# Patient Record
Sex: MALE | Race: WHITE
[De-identification: names, ages, dates, MRNs, and addresses within clinical notes are randomized per-mention and may not be internally consistent; named-entity substitution may affect disease eponyms.]

---

## 2017-12-25 ENCOUNTER — HOSPITAL ENCOUNTER (INPATIENT)
Dept: HOSPITAL 94 - ER | Age: 77
LOS: 6 days | Discharge: HOME | DRG: 543 | End: 2017-12-31
Attending: GENERAL PRACTICE | Admitting: FAMILY MEDICINE
Payer: MEDICARE

## 2017-12-25 VITALS — HEIGHT: 68 IN | WEIGHT: 130.07 LBS | BODY MASS INDEX: 19.71 KG/M2

## 2017-12-25 VITALS — DIASTOLIC BLOOD PRESSURE: 89 MMHG | SYSTOLIC BLOOD PRESSURE: 192 MMHG

## 2017-12-25 DIAGNOSIS — K86.2: ICD-10-CM
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
## 2017-12-25 DIAGNOSIS — F10.10: ICD-10-CM

## 2017-12-25 DIAGNOSIS — G89.29: ICD-10-CM

## 2017-12-25 DIAGNOSIS — M48.56XA: Primary | ICD-10-CM

## 2017-12-25 DIAGNOSIS — Z91.81: ICD-10-CM
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
## 2017-12-25 DIAGNOSIS — K80.20: ICD-10-CM
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
## 2017-12-25 DIAGNOSIS — I25.10: ICD-10-CM

## 2017-12-25 DIAGNOSIS — K76.0: ICD-10-CM

## 2017-12-25 DIAGNOSIS — J90: ICD-10-CM

## 2017-12-25 DIAGNOSIS — Z90.49: ICD-10-CM

## 2017-12-25 DIAGNOSIS — I10: ICD-10-CM

## 2017-12-25 DIAGNOSIS — Z95.1: ICD-10-CM

## 2017-12-25 DIAGNOSIS — M21.379: ICD-10-CM

## 2017-12-25 DIAGNOSIS — Z79.899: ICD-10-CM

## 2017-12-25 LAB
ALBUMIN SERPL BCP-MCNC: 4.2 G/DL (ref 3.4–5)
ALBUMIN/GLOB SERPL: 1 {RATIO} (ref 1.1–1.5)
ALP SERPL-CCNC: 129 IU/L (ref 46–116)
ALT SERPL W P-5'-P-CCNC: 28 U/L (ref 12–78)
ANION GAP SERPL CALCULATED.3IONS-SCNC: 9 MMOL/L (ref 8–16)
APTT PPP: 25 SECONDS (ref 22–32)
AST SERPL W P-5'-P-CCNC: 26 U/L (ref 10–37)
BACTERIA URNS QL MICRO: (no result) /HPF
BASOPHILS # BLD AUTO: 0.1 X10'3 (ref 0–0.2)
BASOPHILS NFR BLD AUTO: 0.6 % (ref 0–1)
BILIRUB SERPL-MCNC: 1.4 MG/DL (ref 0.1–1)
BUN SERPL-MCNC: 15 MG/DL (ref 7–18)
BUN/CREAT SERPL: 15.8 (ref 5.4–32)
CALCIUM SERPL-MCNC: 9.3 MG/DL (ref 8.5–10.1)
CHLORIDE SERPL-SCNC: 105 MMOL/L (ref 99–107)
CO2 SERPL-SCNC: 29.4 MMOL/L (ref 24–32)
CREAT SERPL-MCNC: 0.95 MG/DL (ref 0.6–1.1)
D DIMER PPP FEU-MCNC: 1.65 MG/L FEU (ref 0–0.5)
DEPRECATED SQUAMOUS URNS QL MICRO: (no result) /LPF
EOSINOPHIL # BLD AUTO: 0.2 X10'3 (ref 0–0.9)
EOSINOPHIL NFR BLD AUTO: 2.5 % (ref 0–6)
ERYTHROCYTE [DISTWIDTH] IN BLOOD BY AUTOMATED COUNT: 15.4 % (ref 11.5–14.5)
GFR SERPL CREATININE-BSD FRML MDRD: 77 ML/MIN
GLUCOSE SERPL-MCNC: 73 MG/DL (ref 70–104)
HCT VFR BLD AUTO: 47.3 % (ref 42–52)
HGB BLD-MCNC: 15.7 G/DL (ref 14–17.9)
INR PPP: 0.9 INR
LIPASE SERPL-CCNC: 137 U/L (ref 73–393)
LYMPHOCYTES # BLD AUTO: 2.6 X10'3 (ref 1.1–4.8)
LYMPHOCYTES NFR BLD AUTO: 31.8 % (ref 21–51)
MAGNESIUM SERPL-MCNC: 1.9 MG/DL (ref 1.5–2.4)
MCH RBC QN AUTO: 32.9 PG (ref 27–31)
MCHC RBC AUTO-ENTMCNC: 33.1 % (ref 33–36.5)
MCV RBC AUTO: 99.2 FL (ref 78–98)
MONOCYTES # BLD AUTO: 0.4 X10'3 (ref 0–0.9)
MONOCYTES NFR BLD AUTO: 5.3 % (ref 2–12)
MUCOUS THREADS URNS QL MICRO: (no result) /LPF
NEUTROPHILS # BLD AUTO: 4.9 X10'3 (ref 1.8–7.7)
NEUTROPHILS NFR BLD AUTO: 59.8 % (ref 42–75)
PH UR STRIP: 5 [PH] (ref 4.8–8)
PHOSPHATE SERPL-MCNC: 3.8 MG/DL (ref 2.3–4.5)
PLATELET # BLD AUTO: 263 X10'3 (ref 140–440)
PMV BLD AUTO: 6.6 FL (ref 7.4–10.4)
POTASSIUM SERPL-SCNC: 3.9 MMOL/L (ref 3.5–5.1)
PROT SERPL-MCNC: 8.4 G/DL (ref 6.4–8.2)
PROTHROMBIN TIME: 9.6 SECONDS (ref 9–12)
RBC # BLD AUTO: 4.77 X10'6 (ref 4.7–6.1)
RBC #/AREA URNS HPF: (no result) /HPF (ref 0–2)
SODIUM SERPL-SCNC: 143 MMOL/L (ref 135–145)
SP GR UR STRIP: 1.01 (ref 1–1.03)
URN COLLECT METHOD CLASS: (no result)
UROBILINOGEN UR STRIP-MCNC: 0.2 E.U/DL (ref 0.2–1)
WBC # BLD AUTO: 8.2 X10'3 (ref 4.5–11)
WBC #/AREA URNS HPF: (no result) /HPF (ref 0–4)

## 2017-12-25 PROCEDURE — 84443 ASSAY THYROID STIM HORMONE: CPT

## 2017-12-25 PROCEDURE — 72148 MRI LUMBAR SPINE W/O DYE: CPT

## 2017-12-25 PROCEDURE — 97116 GAIT TRAINING THERAPY: CPT

## 2017-12-25 PROCEDURE — 85379 FIBRIN DEGRADATION QUANT: CPT

## 2017-12-25 PROCEDURE — 85730 THROMBOPLASTIN TIME PARTIAL: CPT

## 2017-12-25 PROCEDURE — 83036 HEMOGLOBIN GLYCOSYLATED A1C: CPT

## 2017-12-25 PROCEDURE — 97162 PT EVAL MOD COMPLEX 30 MIN: CPT

## 2017-12-25 PROCEDURE — 97530 THERAPEUTIC ACTIVITIES: CPT

## 2017-12-25 PROCEDURE — 93005 ELECTROCARDIOGRAM TRACING: CPT

## 2017-12-25 PROCEDURE — 84484 ASSAY OF TROPONIN QUANT: CPT

## 2017-12-25 PROCEDURE — 81001 URINALYSIS AUTO W/SCOPE: CPT

## 2017-12-25 PROCEDURE — 97110 THERAPEUTIC EXERCISES: CPT

## 2017-12-25 PROCEDURE — 85610 PROTHROMBIN TIME: CPT

## 2017-12-25 PROCEDURE — 80053 COMPREHEN METABOLIC PANEL: CPT

## 2017-12-25 PROCEDURE — 74176 CT ABD & PELVIS W/O CONTRAST: CPT

## 2017-12-25 PROCEDURE — 83880 ASSAY OF NATRIURETIC PEPTIDE: CPT

## 2017-12-25 PROCEDURE — 71250 CT THORAX DX C-: CPT

## 2017-12-25 PROCEDURE — 36415 COLL VENOUS BLD VENIPUNCTURE: CPT

## 2017-12-25 PROCEDURE — 82948 REAGENT STRIP/BLOOD GLUCOSE: CPT

## 2017-12-25 PROCEDURE — 87070 CULTURE OTHR SPECIMN AEROBIC: CPT

## 2017-12-25 PROCEDURE — 72131 CT LUMBAR SPINE W/O DYE: CPT

## 2017-12-25 PROCEDURE — 83690 ASSAY OF LIPASE: CPT

## 2017-12-25 PROCEDURE — 85025 COMPLETE CBC W/AUTO DIFF WBC: CPT

## 2017-12-25 PROCEDURE — 83735 ASSAY OF MAGNESIUM: CPT

## 2017-12-25 PROCEDURE — 99285 EMERGENCY DEPT VISIT HI MDM: CPT

## 2017-12-25 PROCEDURE — 82150 ASSAY OF AMYLASE: CPT

## 2017-12-25 PROCEDURE — 84100 ASSAY OF PHOSPHORUS: CPT

## 2017-12-25 PROCEDURE — 76700 US EXAM ABDOM COMPLETE: CPT

## 2017-12-25 RX ADMIN — HYDROCODONE BITARTRATE AND ACETAMINOPHEN PRN TAB: 5; 325 TABLET ORAL at 22:08

## 2017-12-26 VITALS — SYSTOLIC BLOOD PRESSURE: 152 MMHG | DIASTOLIC BLOOD PRESSURE: 81 MMHG

## 2017-12-26 VITALS — DIASTOLIC BLOOD PRESSURE: 78 MMHG | SYSTOLIC BLOOD PRESSURE: 129 MMHG

## 2017-12-26 VITALS — SYSTOLIC BLOOD PRESSURE: 133 MMHG | DIASTOLIC BLOOD PRESSURE: 78 MMHG

## 2017-12-26 VITALS — SYSTOLIC BLOOD PRESSURE: 128 MMHG | DIASTOLIC BLOOD PRESSURE: 74 MMHG

## 2017-12-26 VITALS — DIASTOLIC BLOOD PRESSURE: 61 MMHG | SYSTOLIC BLOOD PRESSURE: 130 MMHG

## 2017-12-26 LAB
ALBUMIN SERPL BCP-MCNC: 3.3 G/DL (ref 3.4–5)
ALBUMIN/GLOB SERPL: 1 {RATIO} (ref 1.1–1.5)
ALP SERPL-CCNC: 101 IU/L (ref 46–116)
ALT SERPL W P-5'-P-CCNC: 22 U/L (ref 12–78)
ANION GAP SERPL CALCULATED.3IONS-SCNC: 11 MMOL/L (ref 8–16)
AST SERPL W P-5'-P-CCNC: 22 U/L (ref 10–37)
BASOPHILS # BLD AUTO: 0 X10'3 (ref 0–0.2)
BASOPHILS NFR BLD AUTO: 0.7 % (ref 0–1)
BILIRUB SERPL-MCNC: 1.7 MG/DL (ref 0.1–1)
BUN SERPL-MCNC: 21 MG/DL (ref 7–18)
BUN/CREAT SERPL: 20.4 (ref 5.4–32)
CALCIUM SERPL-MCNC: 8.5 MG/DL (ref 8.5–10.1)
CHLORIDE SERPL-SCNC: 103 MMOL/L (ref 99–107)
CO2 SERPL-SCNC: 26.3 MMOL/L (ref 24–32)
CREAT SERPL-MCNC: 1.03 MG/DL (ref 0.6–1.1)
EOSINOPHIL # BLD AUTO: 0.1 X10'3 (ref 0–0.9)
EOSINOPHIL NFR BLD AUTO: 2.1 % (ref 0–6)
ERYTHROCYTE [DISTWIDTH] IN BLOOD BY AUTOMATED COUNT: 15 % (ref 11.5–14.5)
GFR SERPL CREATININE-BSD FRML MDRD: 70 ML/MIN
GLUCOSE SERPL-MCNC: 105 MG/DL (ref 70–104)
HCT VFR BLD AUTO: 39.6 % (ref 42–52)
HGB BLD-MCNC: 13.4 G/DL (ref 14–17.9)
LYMPHOCYTES # BLD AUTO: 2.2 X10'3 (ref 1.1–4.8)
LYMPHOCYTES NFR BLD AUTO: 33.6 % (ref 21–51)
MCH RBC QN AUTO: 33.6 PG (ref 27–31)
MCHC RBC AUTO-ENTMCNC: 33.7 % (ref 33–36.5)
MCV RBC AUTO: 99.6 FL (ref 78–98)
MONOCYTES # BLD AUTO: 0.5 X10'3 (ref 0–0.9)
MONOCYTES NFR BLD AUTO: 8.4 % (ref 2–12)
NEUTROPHILS # BLD AUTO: 3.6 X10'3 (ref 1.8–7.7)
NEUTROPHILS NFR BLD AUTO: 55.2 % (ref 42–75)
PLATELET # BLD AUTO: 233 X10'3 (ref 140–440)
PMV BLD AUTO: 7.1 FL (ref 7.4–10.4)
POTASSIUM SERPL-SCNC: 3.7 MMOL/L (ref 3.5–5.1)
PROT SERPL-MCNC: 6.6 G/DL (ref 6.4–8.2)
RBC # BLD AUTO: 3.98 X10'6 (ref 4.7–6.1)
SODIUM SERPL-SCNC: 140 MMOL/L (ref 135–145)
WBC # BLD AUTO: 6.4 X10'3 (ref 4.5–11)

## 2017-12-26 RX ADMIN — HYDROCODONE BITARTRATE AND ACETAMINOPHEN PRN TAB: 5; 325 TABLET ORAL at 04:28

## 2017-12-26 RX ADMIN — DOCUSATE SODIUM SCH MG: 100 CAPSULE, LIQUID FILLED ORAL at 08:06

## 2017-12-26 RX ADMIN — HYDROCODONE BITARTRATE AND ACETAMINOPHEN PRN TAB: 5; 325 TABLET ORAL at 08:27

## 2017-12-26 RX ADMIN — HYDRALAZINE HYDROCHLORIDE SCH MG: 20 INJECTION INTRAMUSCULAR; INTRAVENOUS at 02:00

## 2017-12-26 RX ADMIN — HYDROCODONE BITARTRATE AND ACETAMINOPHEN PRN TAB: 5; 325 TABLET ORAL at 20:02

## 2017-12-26 RX ADMIN — HEPARIN SODIUM SCH UNIT: 5000 INJECTION, SOLUTION INTRAVENOUS; SUBCUTANEOUS at 19:55

## 2017-12-26 RX ADMIN — PANTOPRAZOLE SODIUM SCH MG: 40 TABLET, DELAYED RELEASE ORAL at 08:06

## 2017-12-26 RX ADMIN — DOCUSATE SODIUM SCH MG: 100 CAPSULE, LIQUID FILLED ORAL at 19:55

## 2017-12-26 RX ADMIN — HYDRALAZINE HYDROCHLORIDE SCH MG: 20 INJECTION INTRAMUSCULAR; INTRAVENOUS at 07:54

## 2017-12-26 RX ADMIN — HEPARIN SODIUM SCH UNIT: 5000 INJECTION, SOLUTION INTRAVENOUS; SUBCUTANEOUS at 08:07

## 2017-12-26 RX ADMIN — HYDRALAZINE HYDROCHLORIDE SCH MG: 20 INJECTION INTRAMUSCULAR; INTRAVENOUS at 16:20

## 2017-12-27 VITALS — SYSTOLIC BLOOD PRESSURE: 125 MMHG | DIASTOLIC BLOOD PRESSURE: 67 MMHG

## 2017-12-27 VITALS — SYSTOLIC BLOOD PRESSURE: 154 MMHG | DIASTOLIC BLOOD PRESSURE: 79 MMHG

## 2017-12-27 VITALS — DIASTOLIC BLOOD PRESSURE: 91 MMHG | SYSTOLIC BLOOD PRESSURE: 163 MMHG

## 2017-12-27 VITALS — SYSTOLIC BLOOD PRESSURE: 149 MMHG | DIASTOLIC BLOOD PRESSURE: 83 MMHG

## 2017-12-27 LAB
ALBUMIN SERPL BCP-MCNC: 2.9 G/DL (ref 3.4–5)
ALBUMIN/GLOB SERPL: 0.9 {RATIO} (ref 1.1–1.5)
ALP SERPL-CCNC: 86 IU/L (ref 46–116)
ALT SERPL W P-5'-P-CCNC: 21 U/L (ref 12–78)
ANION GAP SERPL CALCULATED.3IONS-SCNC: 5 MMOL/L (ref 8–16)
AST SERPL W P-5'-P-CCNC: 16 U/L (ref 10–37)
BASOPHILS # BLD AUTO: 0 X10'3 (ref 0–0.2)
BASOPHILS NFR BLD AUTO: 0.6 % (ref 0–1)
BILIRUB SERPL-MCNC: 1 MG/DL (ref 0.1–1)
BUN SERPL-MCNC: 17 MG/DL (ref 7–18)
BUN/CREAT SERPL: 15.3 (ref 5.4–32)
CALCIUM SERPL-MCNC: 8.7 MG/DL (ref 8.5–10.1)
CHLORIDE SERPL-SCNC: 106 MMOL/L (ref 99–107)
CO2 SERPL-SCNC: 28.6 MMOL/L (ref 24–32)
CREAT SERPL-MCNC: 1.11 MG/DL (ref 0.6–1.1)
EOSINOPHIL # BLD AUTO: 0.2 X10'3 (ref 0–0.9)
EOSINOPHIL NFR BLD AUTO: 2.7 % (ref 0–6)
ERYTHROCYTE [DISTWIDTH] IN BLOOD BY AUTOMATED COUNT: 15.6 % (ref 11.5–14.5)
GFR SERPL CREATININE-BSD FRML MDRD: 64 ML/MIN
GLUCOSE SERPL-MCNC: 101 MG/DL (ref 70–104)
HCT VFR BLD AUTO: 37.3 % (ref 42–52)
HGB BLD-MCNC: 12.6 G/DL (ref 14–17.9)
LYMPHOCYTES # BLD AUTO: 2.5 X10'3 (ref 1.1–4.8)
LYMPHOCYTES NFR BLD AUTO: 38.6 % (ref 21–51)
MCH RBC QN AUTO: 33.7 PG (ref 27–31)
MCHC RBC AUTO-ENTMCNC: 33.6 % (ref 33–36.5)
MCV RBC AUTO: 100.3 FL (ref 78–98)
MONOCYTES # BLD AUTO: 0.6 X10'3 (ref 0–0.9)
MONOCYTES NFR BLD AUTO: 9.4 % (ref 2–12)
NEUTROPHILS # BLD AUTO: 3.1 X10'3 (ref 1.8–7.7)
NEUTROPHILS NFR BLD AUTO: 48.7 % (ref 42–75)
PLATELET # BLD AUTO: 222 X10'3 (ref 140–440)
PMV BLD AUTO: 7.1 FL (ref 7.4–10.4)
POTASSIUM SERPL-SCNC: 3.8 MMOL/L (ref 3.5–5.1)
PROT SERPL-MCNC: 6 G/DL (ref 6.4–8.2)
RBC # BLD AUTO: 3.72 X10'6 (ref 4.7–6.1)
SODIUM SERPL-SCNC: 140 MMOL/L (ref 135–145)
WBC # BLD AUTO: 6.4 X10'3 (ref 4.5–11)

## 2017-12-27 RX ADMIN — HYDROCODONE BITARTRATE AND ACETAMINOPHEN PRN TAB: 5; 325 TABLET ORAL at 16:58

## 2017-12-27 RX ADMIN — DOCUSATE SODIUM SCH MG: 100 CAPSULE, LIQUID FILLED ORAL at 20:00

## 2017-12-27 RX ADMIN — HYDROCODONE BITARTRATE AND ACETAMINOPHEN PRN TAB: 5; 325 TABLET ORAL at 01:01

## 2017-12-27 RX ADMIN — HEPARIN SODIUM SCH UNIT: 5000 INJECTION, SOLUTION INTRAVENOUS; SUBCUTANEOUS at 20:00

## 2017-12-27 RX ADMIN — PANTOPRAZOLE SODIUM SCH MG: 40 TABLET, DELAYED RELEASE ORAL at 08:57

## 2017-12-27 RX ADMIN — DOCUSATE SODIUM SCH MG: 100 CAPSULE, LIQUID FILLED ORAL at 08:57

## 2017-12-27 RX ADMIN — HEPARIN SODIUM SCH UNIT: 5000 INJECTION, SOLUTION INTRAVENOUS; SUBCUTANEOUS at 08:57

## 2017-12-28 VITALS — DIASTOLIC BLOOD PRESSURE: 81 MMHG | SYSTOLIC BLOOD PRESSURE: 148 MMHG

## 2017-12-28 VITALS — SYSTOLIC BLOOD PRESSURE: 131 MMHG | DIASTOLIC BLOOD PRESSURE: 89 MMHG

## 2017-12-28 VITALS — DIASTOLIC BLOOD PRESSURE: 81 MMHG | SYSTOLIC BLOOD PRESSURE: 126 MMHG

## 2017-12-28 VITALS — SYSTOLIC BLOOD PRESSURE: 144 MMHG | DIASTOLIC BLOOD PRESSURE: 82 MMHG

## 2017-12-28 LAB
ALBUMIN SERPL BCP-MCNC: 3.2 G/DL (ref 3.4–5)
ALBUMIN/GLOB SERPL: 0.9 {RATIO} (ref 1.1–1.5)
ALP SERPL-CCNC: 96 IU/L (ref 46–116)
ALT SERPL W P-5'-P-CCNC: 21 U/L (ref 12–78)
ANION GAP SERPL CALCULATED.3IONS-SCNC: 7 MMOL/L (ref 8–16)
AST SERPL W P-5'-P-CCNC: 18 U/L (ref 10–37)
BASOPHILS # BLD AUTO: 0 X10'3 (ref 0–0.2)
BASOPHILS NFR BLD AUTO: 0.4 % (ref 0–1)
BILIRUB SERPL-MCNC: 1.1 MG/DL (ref 0.1–1)
BUN SERPL-MCNC: 8 MG/DL (ref 7–18)
BUN/CREAT SERPL: 7.6 (ref 5.4–32)
CALCIUM SERPL-MCNC: 9.1 MG/DL (ref 8.5–10.1)
CHLORIDE SERPL-SCNC: 105 MMOL/L (ref 99–107)
CO2 SERPL-SCNC: 30.2 MMOL/L (ref 24–32)
CREAT SERPL-MCNC: 1.05 MG/DL (ref 0.6–1.1)
EOSINOPHIL # BLD AUTO: 0.3 X10'3 (ref 0–0.9)
EOSINOPHIL NFR BLD AUTO: 3.9 % (ref 0–6)
ERYTHROCYTE [DISTWIDTH] IN BLOOD BY AUTOMATED COUNT: 15.3 % (ref 11.5–14.5)
GFR SERPL CREATININE-BSD FRML MDRD: 68 ML/MIN
GLUCOSE SERPL-MCNC: 84 MG/DL (ref 70–104)
HCT VFR BLD AUTO: 41.4 % (ref 42–52)
HGB BLD-MCNC: 13.9 G/DL (ref 14–17.9)
LYMPHOCYTES # BLD AUTO: 2.6 X10'3 (ref 1.1–4.8)
LYMPHOCYTES NFR BLD AUTO: 37.2 % (ref 21–51)
MCH RBC QN AUTO: 33.8 PG (ref 27–31)
MCHC RBC AUTO-ENTMCNC: 33.5 % (ref 33–36.5)
MCV RBC AUTO: 100.7 FL (ref 78–98)
MONOCYTES # BLD AUTO: 0.6 X10'3 (ref 0–0.9)
MONOCYTES NFR BLD AUTO: 9.3 % (ref 2–12)
NEUTROPHILS # BLD AUTO: 3.4 X10'3 (ref 1.8–7.7)
NEUTROPHILS NFR BLD AUTO: 49.2 % (ref 42–75)
PLATELET # BLD AUTO: 240 X10'3 (ref 140–440)
PMV BLD AUTO: 7.3 FL (ref 7.4–10.4)
POTASSIUM SERPL-SCNC: 3.6 MMOL/L (ref 3.5–5.1)
PROT SERPL-MCNC: 6.6 G/DL (ref 6.4–8.2)
RBC # BLD AUTO: 4.11 X10'6 (ref 4.7–6.1)
SODIUM SERPL-SCNC: 142 MMOL/L (ref 135–145)
WBC # BLD AUTO: 6.9 X10'3 (ref 4.5–11)

## 2017-12-28 RX ADMIN — HEPARIN SODIUM SCH UNIT: 5000 INJECTION, SOLUTION INTRAVENOUS; SUBCUTANEOUS at 08:00

## 2017-12-28 RX ADMIN — PANTOPRAZOLE SODIUM SCH MG: 40 TABLET, DELAYED RELEASE ORAL at 10:12

## 2017-12-28 RX ADMIN — HEPARIN SODIUM SCH UNIT: 5000 INJECTION, SOLUTION INTRAVENOUS; SUBCUTANEOUS at 20:16

## 2017-12-28 RX ADMIN — DOCUSATE SODIUM SCH MG: 100 CAPSULE, LIQUID FILLED ORAL at 08:00

## 2017-12-28 RX ADMIN — THERA TABS SCH EACH: TAB at 10:12

## 2017-12-28 RX ADMIN — DOCUSATE SODIUM SCH MG: 100 CAPSULE, LIQUID FILLED ORAL at 20:15

## 2017-12-29 VITALS — SYSTOLIC BLOOD PRESSURE: 128 MMHG | DIASTOLIC BLOOD PRESSURE: 65 MMHG

## 2017-12-29 VITALS — DIASTOLIC BLOOD PRESSURE: 44 MMHG | SYSTOLIC BLOOD PRESSURE: 118 MMHG

## 2017-12-29 VITALS — SYSTOLIC BLOOD PRESSURE: 123 MMHG | DIASTOLIC BLOOD PRESSURE: 73 MMHG

## 2017-12-29 VITALS — DIASTOLIC BLOOD PRESSURE: 74 MMHG | SYSTOLIC BLOOD PRESSURE: 139 MMHG

## 2017-12-29 VITALS — SYSTOLIC BLOOD PRESSURE: 136 MMHG | DIASTOLIC BLOOD PRESSURE: 71 MMHG

## 2017-12-29 LAB
ALBUMIN SERPL BCP-MCNC: 3.2 G/DL (ref 3.4–5)
ALBUMIN/GLOB SERPL: 0.9 {RATIO} (ref 1.1–1.5)
ALP SERPL-CCNC: 97 IU/L (ref 46–116)
ALT SERPL W P-5'-P-CCNC: 33 U/L (ref 12–78)
AMYLASE SERPL-CCNC: 62 U/L (ref 25–115)
ANION GAP SERPL CALCULATED.3IONS-SCNC: 6 MMOL/L (ref 8–16)
AST SERPL W P-5'-P-CCNC: 42 U/L (ref 10–37)
BASOPHILS # BLD AUTO: 0.1 X10'3 (ref 0–0.2)
BASOPHILS NFR BLD AUTO: 0.9 % (ref 0–1)
BILIRUB SERPL-MCNC: 0.7 MG/DL (ref 0.1–1)
BUN SERPL-MCNC: 16 MG/DL (ref 7–18)
BUN/CREAT SERPL: 15.7 (ref 5.4–32)
CALCIUM SERPL-MCNC: 9.1 MG/DL (ref 8.5–10.1)
CHLORIDE SERPL-SCNC: 104 MMOL/L (ref 99–107)
CO2 SERPL-SCNC: 29.7 MMOL/L (ref 24–32)
CREAT SERPL-MCNC: 1.02 MG/DL (ref 0.6–1.1)
EOSINOPHIL # BLD AUTO: 0.2 X10'3 (ref 0–0.9)
EOSINOPHIL NFR BLD AUTO: 3.2 % (ref 0–6)
ERYTHROCYTE [DISTWIDTH] IN BLOOD BY AUTOMATED COUNT: 15 % (ref 11.5–14.5)
GFR SERPL CREATININE-BSD FRML MDRD: 71 ML/MIN
GLUCOSE SERPL-MCNC: 104 MG/DL (ref 70–104)
HCT VFR BLD AUTO: 38.6 % (ref 42–52)
HGB BLD-MCNC: 13.1 G/DL (ref 14–17.9)
INR PPP: 1 INR
LIPASE SERPL-CCNC: 148 U/L (ref 73–393)
LYMPHOCYTES # BLD AUTO: 2.4 X10'3 (ref 1.1–4.8)
LYMPHOCYTES NFR BLD AUTO: 31.9 % (ref 21–51)
MAGNESIUM SERPL-MCNC: 1.8 MG/DL (ref 1.5–2.4)
MCH RBC QN AUTO: 33.9 PG (ref 27–31)
MCHC RBC AUTO-ENTMCNC: 33.9 % (ref 33–36.5)
MCV RBC AUTO: 99.9 FL (ref 78–98)
MONOCYTES # BLD AUTO: 0.7 X10'3 (ref 0–0.9)
MONOCYTES NFR BLD AUTO: 9.4 % (ref 2–12)
NEUTROPHILS # BLD AUTO: 4.1 X10'3 (ref 1.8–7.7)
NEUTROPHILS NFR BLD AUTO: 54.6 % (ref 42–75)
PHOSPHATE SERPL-MCNC: 3.9 MG/DL (ref 2.3–4.5)
PLATELET # BLD AUTO: 232 X10'3 (ref 140–440)
PMV BLD AUTO: 7.3 FL (ref 7.4–10.4)
POTASSIUM SERPL-SCNC: 4 MMOL/L (ref 3.5–5.1)
PROT SERPL-MCNC: 6.7 G/DL (ref 6.4–8.2)
PROTHROMBIN TIME: 10 SECONDS (ref 9–12)
RBC # BLD AUTO: 3.86 X10'6 (ref 4.7–6.1)
SODIUM SERPL-SCNC: 140 MMOL/L (ref 135–145)
WBC # BLD AUTO: 7.5 X10'3 (ref 4.5–11)

## 2017-12-29 RX ADMIN — HEPARIN SODIUM SCH UNIT: 5000 INJECTION, SOLUTION INTRAVENOUS; SUBCUTANEOUS at 19:55

## 2017-12-29 RX ADMIN — HEPARIN SODIUM SCH UNIT: 5000 INJECTION, SOLUTION INTRAVENOUS; SUBCUTANEOUS at 09:07

## 2017-12-29 RX ADMIN — THERA TABS SCH EACH: TAB at 09:05

## 2017-12-29 RX ADMIN — PANTOPRAZOLE SODIUM SCH MG: 40 TABLET, DELAYED RELEASE ORAL at 09:06

## 2017-12-29 RX ADMIN — DOCUSATE SODIUM SCH MG: 100 CAPSULE, LIQUID FILLED ORAL at 09:06

## 2017-12-29 RX ADMIN — DOCUSATE SODIUM SCH MG: 100 CAPSULE, LIQUID FILLED ORAL at 19:55

## 2017-12-30 VITALS — DIASTOLIC BLOOD PRESSURE: 94 MMHG | SYSTOLIC BLOOD PRESSURE: 148 MMHG

## 2017-12-30 VITALS — DIASTOLIC BLOOD PRESSURE: 65 MMHG | SYSTOLIC BLOOD PRESSURE: 135 MMHG

## 2017-12-30 VITALS — SYSTOLIC BLOOD PRESSURE: 119 MMHG | DIASTOLIC BLOOD PRESSURE: 69 MMHG

## 2017-12-30 VITALS — DIASTOLIC BLOOD PRESSURE: 75 MMHG | SYSTOLIC BLOOD PRESSURE: 95 MMHG

## 2017-12-30 LAB
ALBUMIN SERPL BCP-MCNC: 3.1 G/DL (ref 3.4–5)
ALBUMIN/GLOB SERPL: 0.9 {RATIO} (ref 1.1–1.5)
ALP SERPL-CCNC: 93 IU/L (ref 46–116)
ALT SERPL W P-5'-P-CCNC: 72 U/L (ref 12–78)
AMYLASE SERPL-CCNC: 63 U/L (ref 25–115)
ANION GAP SERPL CALCULATED.3IONS-SCNC: 7 MMOL/L (ref 8–16)
AST SERPL W P-5'-P-CCNC: 88 U/L (ref 10–37)
BASOPHILS # BLD AUTO: 0 X10'3 (ref 0–0.2)
BASOPHILS NFR BLD AUTO: 0.6 % (ref 0–1)
BILIRUB SERPL-MCNC: 0.6 MG/DL (ref 0.1–1)
BUN SERPL-MCNC: 18 MG/DL (ref 7–18)
BUN/CREAT SERPL: 17.8 (ref 5.4–32)
CALCIUM SERPL-MCNC: 9.2 MG/DL (ref 8.5–10.1)
CHLORIDE SERPL-SCNC: 105 MMOL/L (ref 99–107)
CO2 SERPL-SCNC: 27.8 MMOL/L (ref 24–32)
CREAT SERPL-MCNC: 1.01 MG/DL (ref 0.6–1.1)
EOSINOPHIL # BLD AUTO: 0.2 X10'3 (ref 0–0.9)
EOSINOPHIL NFR BLD AUTO: 3.1 % (ref 0–6)
ERYTHROCYTE [DISTWIDTH] IN BLOOD BY AUTOMATED COUNT: 15.5 % (ref 11.5–14.5)
GFR SERPL CREATININE-BSD FRML MDRD: 72 ML/MIN
GLUCOSE SERPL-MCNC: 111 MG/DL (ref 70–104)
HCT VFR BLD AUTO: 39.5 % (ref 42–52)
HGB BLD-MCNC: 13.3 G/DL (ref 14–17.9)
INR PPP: 1 INR
LIPASE SERPL-CCNC: 143 U/L (ref 73–393)
LYMPHOCYTES # BLD AUTO: 2.8 X10'3 (ref 1.1–4.8)
LYMPHOCYTES NFR BLD AUTO: 38.2 % (ref 21–51)
MAGNESIUM SERPL-MCNC: 1.8 MG/DL (ref 1.5–2.4)
MCH RBC QN AUTO: 33.7 PG (ref 27–31)
MCHC RBC AUTO-ENTMCNC: 33.6 % (ref 33–36.5)
MCV RBC AUTO: 100.1 FL (ref 78–98)
MONOCYTES # BLD AUTO: 0.7 X10'3 (ref 0–0.9)
MONOCYTES NFR BLD AUTO: 9 % (ref 2–12)
NEUTROPHILS # BLD AUTO: 3.6 X10'3 (ref 1.8–7.7)
NEUTROPHILS NFR BLD AUTO: 49.1 % (ref 42–75)
PHOSPHATE SERPL-MCNC: 3.6 MG/DL (ref 2.3–4.5)
PLATELET # BLD AUTO: 228 X10'3 (ref 140–440)
PMV BLD AUTO: 7.4 FL (ref 7.4–10.4)
POTASSIUM SERPL-SCNC: 3.8 MMOL/L (ref 3.5–5.1)
PROT SERPL-MCNC: 6.6 G/DL (ref 6.4–8.2)
PROTHROMBIN TIME: 9.9 SECONDS (ref 9–12)
RBC # BLD AUTO: 3.95 X10'6 (ref 4.7–6.1)
SODIUM SERPL-SCNC: 140 MMOL/L (ref 135–145)
WBC # BLD AUTO: 7.4 X10'3 (ref 4.5–11)

## 2017-12-30 RX ADMIN — DOCUSATE SODIUM SCH MG: 100 CAPSULE, LIQUID FILLED ORAL at 20:38

## 2017-12-30 RX ADMIN — DOCUSATE SODIUM SCH MG: 100 CAPSULE, LIQUID FILLED ORAL at 08:24

## 2017-12-30 RX ADMIN — THERA TABS SCH EACH: TAB at 08:24

## 2017-12-30 RX ADMIN — HEPARIN SODIUM SCH UNIT: 5000 INJECTION, SOLUTION INTRAVENOUS; SUBCUTANEOUS at 20:38

## 2017-12-30 RX ADMIN — HEPARIN SODIUM SCH UNIT: 5000 INJECTION, SOLUTION INTRAVENOUS; SUBCUTANEOUS at 08:26

## 2017-12-30 RX ADMIN — PANTOPRAZOLE SODIUM SCH MG: 40 TABLET, DELAYED RELEASE ORAL at 08:24

## 2017-12-31 VITALS — DIASTOLIC BLOOD PRESSURE: 81 MMHG | SYSTOLIC BLOOD PRESSURE: 150 MMHG

## 2017-12-31 VITALS — SYSTOLIC BLOOD PRESSURE: 139 MMHG | DIASTOLIC BLOOD PRESSURE: 79 MMHG

## 2017-12-31 LAB
AMYLASE SERPL-CCNC: 65 U/L (ref 25–115)
INR PPP: 1 INR
LIPASE SERPL-CCNC: 160 U/L (ref 73–393)
MAGNESIUM SERPL-MCNC: 1.9 MG/DL (ref 1.5–2.4)
PHOSPHATE SERPL-MCNC: 3.1 MG/DL (ref 2.3–4.5)
PROTHROMBIN TIME: 10 SECONDS (ref 9–12)

## 2017-12-31 RX ADMIN — THERA TABS SCH EACH: TAB at 08:05

## 2017-12-31 RX ADMIN — HEPARIN SODIUM SCH UNIT: 5000 INJECTION, SOLUTION INTRAVENOUS; SUBCUTANEOUS at 08:00

## 2017-12-31 RX ADMIN — DOCUSATE SODIUM SCH MG: 100 CAPSULE, LIQUID FILLED ORAL at 08:04

## 2017-12-31 RX ADMIN — HEPARIN SODIUM SCH UNIT: 5000 INJECTION, SOLUTION INTRAVENOUS; SUBCUTANEOUS at 08:05

## 2017-12-31 RX ADMIN — PANTOPRAZOLE SODIUM SCH MG: 40 TABLET, DELAYED RELEASE ORAL at 08:05

## 2021-12-26 ENCOUNTER — HOSPITAL ENCOUNTER (INPATIENT)
Dept: HOSPITAL 94 - ER | Age: 81
LOS: 10 days | DRG: 64 | End: 2022-01-05
Attending: HOSPITALIST | Admitting: SPECIALIST
Payer: MEDICARE

## 2021-12-26 VITALS — WEIGHT: 140.21 LBS | BODY MASS INDEX: 20.07 KG/M2 | HEIGHT: 70 IN

## 2021-12-26 DIAGNOSIS — I25.10: ICD-10-CM

## 2021-12-26 DIAGNOSIS — Z86.73: ICD-10-CM

## 2021-12-26 DIAGNOSIS — R64: ICD-10-CM

## 2021-12-26 DIAGNOSIS — I62.01: Primary | ICD-10-CM

## 2021-12-26 DIAGNOSIS — Z66: ICD-10-CM

## 2021-12-26 DIAGNOSIS — G93.6: ICD-10-CM

## 2021-12-26 DIAGNOSIS — F03.90: ICD-10-CM

## 2021-12-26 DIAGNOSIS — R40.0: ICD-10-CM

## 2021-12-26 DIAGNOSIS — I62.03: ICD-10-CM

## 2021-12-26 DIAGNOSIS — Z95.1: ICD-10-CM

## 2021-12-26 DIAGNOSIS — Z51.5: ICD-10-CM

## 2021-12-26 LAB
ALBUMIN SERPL BCP-MCNC: 3.3 G/DL (ref 3.4–5)
ALBUMIN/GLOB SERPL: 0.9 {RATIO} (ref 1.1–1.5)
ALP SERPL-CCNC: 138 IU/L (ref 46–116)
ALT SERPL W P-5'-P-CCNC: 21 U/L (ref 12–78)
ANION GAP SERPL CALCULATED.3IONS-SCNC: 9 MMOL/L (ref 8–16)
AST SERPL W P-5'-P-CCNC: 32 U/L (ref 10–37)
BASOPHILS # BLD AUTO: 0 X10'3 (ref 0–0.2)
BASOPHILS NFR BLD AUTO: 0.3 % (ref 0–1)
BILIRUB SERPL-MCNC: 1.4 MG/DL (ref 0.1–1)
BUN SERPL-MCNC: 28 MG/DL (ref 7–18)
BUN/CREAT SERPL: 22 (ref 5.4–32)
CALCIUM SERPL-MCNC: 8.8 MG/DL (ref 8.5–10.1)
CHLORIDE SERPL-SCNC: 105 MMOL/L (ref 99–107)
CO2 SERPL-SCNC: 27.7 MMOL/L (ref 24–32)
CREAT SERPL-MCNC: 1.27 MG/DL (ref 0.6–1.1)
EOSINOPHIL # BLD AUTO: 0 X10'3 (ref 0–0.9)
EOSINOPHIL NFR BLD AUTO: 0.2 % (ref 0–6)
ERYTHROCYTE [DISTWIDTH] IN BLOOD BY AUTOMATED COUNT: 14.2 % (ref 11.5–14.5)
GFR SERPL CREATININE-BSD FRML MDRD: 54 ML/MIN
GLUCOSE SERPL-MCNC: 129 MG/DL (ref 70–104)
HCT VFR BLD AUTO: 38.6 % (ref 42–52)
HGB BLD-MCNC: 13 G/DL (ref 14–17.9)
LYMPHOCYTES # BLD AUTO: 0.8 X10'3 (ref 1.1–4.8)
LYMPHOCYTES NFR BLD AUTO: 7.2 % (ref 21–51)
MCH RBC QN AUTO: 32.1 PG (ref 27–31)
MCHC RBC AUTO-ENTMCNC: 33.6 G/DL (ref 33–36.5)
MCV RBC AUTO: 95.4 FL (ref 78–98)
MONOCYTES # BLD AUTO: 0.7 X10'3 (ref 0–0.9)
MONOCYTES NFR BLD AUTO: 6.1 % (ref 2–12)
NEUTROPHILS # BLD AUTO: 9.2 X10'3 (ref 1.8–7.7)
NEUTROPHILS NFR BLD AUTO: 86.2 % (ref 42–75)
PLATELET # BLD AUTO: 353 X10'3 (ref 140–440)
PMV BLD AUTO: 6.8 FL (ref 7.4–10.4)
POTASSIUM SERPL-SCNC: 3.5 MMOL/L (ref 3.5–5.1)
PROT SERPL-MCNC: 6.9 G/DL (ref 6.4–8.2)
RBC # BLD AUTO: 4.04 X10'6 (ref 4.7–6.1)
SODIUM SERPL-SCNC: 142 MMOL/L (ref 135–145)
WBC # BLD AUTO: 10.7 X10'3 (ref 4.5–11)

## 2021-12-26 PROCEDURE — 70450 CT HEAD/BRAIN W/O DYE: CPT

## 2021-12-26 PROCEDURE — 85025 COMPLETE CBC W/AUTO DIFF WBC: CPT

## 2021-12-26 PROCEDURE — 36415 COLL VENOUS BLD VENIPUNCTURE: CPT

## 2021-12-26 PROCEDURE — 93005 ELECTROCARDIOGRAM TRACING: CPT

## 2021-12-26 PROCEDURE — 71045 X-RAY EXAM CHEST 1 VIEW: CPT

## 2021-12-26 PROCEDURE — 85610 PROTHROMBIN TIME: CPT

## 2021-12-26 PROCEDURE — 84443 ASSAY THYROID STIM HORMONE: CPT

## 2021-12-26 PROCEDURE — 80053 COMPREHEN METABOLIC PANEL: CPT

## 2021-12-26 PROCEDURE — 99285 EMERGENCY DEPT VISIT HI MDM: CPT

## 2021-12-27 VITALS — SYSTOLIC BLOOD PRESSURE: 199 MMHG | DIASTOLIC BLOOD PRESSURE: 83 MMHG

## 2021-12-27 RX ADMIN — MORPHINE SULFATE PRN MG: 10 SOLUTION ORAL at 16:41

## 2021-12-27 RX ADMIN — MORPHINE SULFATE PRN MG: 10 SOLUTION ORAL at 21:19

## 2021-12-27 NOTE — NUR
Received pt in bed, he appears to be resting (right side-lying). Arousable with shaking but 
he resists care and examination. Positioned with pillows. Frequent monitoring in place

## 2021-12-27 NOTE — NUR
PAGER ID:  [phone] 

MESSAGE:  [id number] CONRAD Luis. Comfort Care patient from ER... I need meds for him. Jailyn [phone]

## 2021-12-28 VITALS — SYSTOLIC BLOOD PRESSURE: 81 MMHG | DIASTOLIC BLOOD PRESSURE: 36 MMHG

## 2021-12-28 VITALS — DIASTOLIC BLOOD PRESSURE: 43 MMHG | SYSTOLIC BLOOD PRESSURE: 95 MMHG

## 2021-12-28 RX ADMIN — MORPHINE SULFATE PRN MG: 10 SOLUTION ORAL at 20:28

## 2021-12-28 NOTE — NUR
Change of shift report given to Devorah RN

-------------------------------------------------------------------------------

Addendum: 12/28/21 at 0650 by Cristy Shea RN

-------------------------------------------------------------------------------

Amended: Links added.

## 2021-12-28 NOTE — NUR
Noted pt made DNR w/ comfort care per EMR. No documented BM this admit 

currently w/ NPO diet active in EMR. Will continue to follow.

Rec:

1. bowel care per rx

-------------------------------------------------------------------------------

Addendum: 12/28/21 at 1337 by Reuben Boles RD

-------------------------------------------------------------------------------

Amended: Links added.

## 2021-12-29 VITALS — SYSTOLIC BLOOD PRESSURE: 190 MMHG | DIASTOLIC BLOOD PRESSURE: 99 MMHG

## 2021-12-29 VITALS — DIASTOLIC BLOOD PRESSURE: 78 MMHG | SYSTOLIC BLOOD PRESSURE: 142 MMHG

## 2021-12-29 VITALS — SYSTOLIC BLOOD PRESSURE: 183 MMHG | DIASTOLIC BLOOD PRESSURE: 80 MMHG

## 2021-12-29 RX ADMIN — MORPHINE SULFATE PRN MG: 10 SOLUTION ORAL at 11:33

## 2021-12-29 NOTE — NUR
Problems reprioritized. Patient report given, questions answered & plan of care reviewed 
with VLADISLAV. 

-------------------------------------------------------------------------------

Addendum: 12/29/21 at 0620 by Jerrell Hernandez RN

-------------------------------------------------------------------------------

Amended: Links added.

## 2021-12-29 NOTE — NUR
Patient in room [facility]. I have received report from DIMITRI Fairchild    and had the opportunity to 
ask questions and assume patient care.

## 2021-12-29 NOTE — NUR
patient is moving constantly and SBP is 180 now and was 170 at 700am. Medicated patient with 
liquid morphine. patient is not oriented

## 2021-12-30 VITALS — DIASTOLIC BLOOD PRESSURE: 75 MMHG | SYSTOLIC BLOOD PRESSURE: 165 MMHG

## 2021-12-30 VITALS — SYSTOLIC BLOOD PRESSURE: 153 MMHG | DIASTOLIC BLOOD PRESSURE: 83 MMHG

## 2021-12-30 RX ADMIN — MORPHINE SULFATE PRN MG: 10 SOLUTION ORAL at 19:29

## 2021-12-30 RX ADMIN — MORPHINE SULFATE PRN MG: 10 SOLUTION ORAL at 04:24

## 2021-12-30 NOTE — NUR
Problems reprioritized. Patient report given, questions answered & plan of care reviewed 
with DIMITRI Fairchild.

## 2021-12-31 VITALS — SYSTOLIC BLOOD PRESSURE: 183 MMHG | DIASTOLIC BLOOD PRESSURE: 66 MMHG

## 2021-12-31 VITALS — DIASTOLIC BLOOD PRESSURE: 71 MMHG | SYSTOLIC BLOOD PRESSURE: 169 MMHG

## 2021-12-31 NOTE — NUR
PAGER ID:  [phone] 

MESSAGE:  Ghisl[id number] Re: Toby TerryA can I put peralta in for comfort care orders



Dr Hwang called back stated no she does not want to a peralta placed in patient she is worried 
about infection occurring. I advised I have tried a Condom cath x2 and it will not stay in 
place. I am concerned about skin break down. Per Dr Hwang she is concerned about infection so 
no peralta at this time.

-------------------------------------------------------------------------------

Addendum: 12/31/21 at 1651 by Ghislaine Anguiano RN

-------------------------------------------------------------------------------

Jimmy Olea is aware of paperwork in chart she will make arrangements to  the 
paperwork.

## 2021-12-31 NOTE — NUR
PAGER ID:  [phone] 

MESSAGE:  Ghislaine-[facility] [id number] Re: Toby TerryA Can I place a peralta for comfort care?

## 2021-12-31 NOTE — NUR
Spoke to patients step daughter Emmie who states she wants the patient to go to  
office when he passes because she can't afford  or cremation.



Spoke to Carmela case management  who stated: Emmie the stepdaughter will have to fill out  
application for indingent cremation if she wants him to go to coroners office when he 
passes. Patients step daughter will have to fill out the paper work and take to coroners 
office herself. Carmela will fax up paper work that we will place in the chart.

## 2021-12-31 NOTE — NUR
Problems reprioritized. Patient report given, questions answered & plan of care reviewed 
with Margaret MOSLEY.

## 2022-01-01 VITALS — DIASTOLIC BLOOD PRESSURE: 80 MMHG | SYSTOLIC BLOOD PRESSURE: 142 MMHG

## 2022-01-01 RX ADMIN — MORPHINE SULFATE PRN MG: 10 SOLUTION ORAL at 20:20

## 2022-01-01 NOTE — NUR
Patient in room [facility]. I have received report from  THUY  and had the opportunity to ask 
questions and assume patient care.

## 2022-01-01 NOTE — NUR
I addressed with Dr Hwang again regarding getting a Peralta catheter for comfort and she does 
not want a peralta in patient. Reminded her patient is incontinent and a condom cath does not 
work on patient and patient is very contracted and hard to move.

## 2022-01-01 NOTE — NUR
Problems reprioritized. Patient report given, questions answered & plan of care reviewed 
with Janelle MOSLEY.

## 2022-01-02 VITALS — SYSTOLIC BLOOD PRESSURE: 194 MMHG | DIASTOLIC BLOOD PRESSURE: 112 MMHG

## 2022-01-02 RX ADMIN — MORPHINE SULFATE PRN MG: 10 SOLUTION ORAL at 17:46

## 2022-01-02 RX ADMIN — MORPHINE SULFATE PRN MG: 10 SOLUTION ORAL at 11:31

## 2022-01-02 RX ADMIN — MORPHINE SULFATE PRN MG: 10 SOLUTION ORAL at 02:43

## 2022-01-02 RX ADMIN — MORPHINE SULFATE PRN MG: 10 SOLUTION ORAL at 21:00

## 2022-01-02 NOTE — NUR
Patient in room [facility]. I have received report from DIMITRI Luis   and had the opportunity to 
ask questions and assume patient care.

## 2022-01-02 NOTE — NUR
Problems reprioritized. Patient report given, questions answered & plan of care reviewed 
with DIMITRI Lopez.

## 2022-01-02 NOTE — NUR
Problems reprioritized. Patient report given, questions answered & plan of care reviewed 
with SANJEEV.

## 2022-01-03 VITALS — SYSTOLIC BLOOD PRESSURE: 182 MMHG | DIASTOLIC BLOOD PRESSURE: 110 MMHG

## 2022-01-03 VITALS — DIASTOLIC BLOOD PRESSURE: 107 MMHG | SYSTOLIC BLOOD PRESSURE: 197 MMHG

## 2022-01-03 VITALS — SYSTOLIC BLOOD PRESSURE: 184 MMHG | DIASTOLIC BLOOD PRESSURE: 95 MMHG

## 2022-01-03 RX ADMIN — MORPHINE SULFATE PRN MG: 10 SOLUTION ORAL at 08:42

## 2022-01-03 RX ADMIN — MORPHINE SULFATE PRN MG: 10 SOLUTION ORAL at 21:44

## 2022-01-03 RX ADMIN — MORPHINE SULFATE PRN MG: 10 SOLUTION ORAL at 05:34

## 2022-01-03 RX ADMIN — MORPHINE SULFATE PRN MG: 10 SOLUTION ORAL at 19:19

## 2022-01-03 NOTE — NUR
Problems reprioritized. Patient report given, questions answered & plan of care reviewed 
with SPRING. 

-------------------------------------------------------------------------------

Addendum: 01/03/22 at 0645 by Jerrell Hernandez RN

-------------------------------------------------------------------------------

Amended: Links added.

## 2022-01-03 NOTE — NUR
Noted pt made DNR w/ comfort care per EMR. No documented BM this admit

currently w/ NPO diet active in EMR since 12/27. Will continue to follow.

Rec:

1. bowel care per rx

-------------------------------------------------------------------------------

Addendum: 01/03/22 at 0752 by Joe Salcedo RD

-------------------------------------------------------------------------------

Amended: Links added.

## 2022-01-03 NOTE — NUR
Unable to do oral care, patient tries to bite and clamps down on anything that comes into 
his mouth.

## 2022-01-04 VITALS — DIASTOLIC BLOOD PRESSURE: 104 MMHG | SYSTOLIC BLOOD PRESSURE: 184 MMHG

## 2022-01-04 VITALS — DIASTOLIC BLOOD PRESSURE: 100 MMHG | SYSTOLIC BLOOD PRESSURE: 156 MMHG

## 2022-01-04 RX ADMIN — MORPHINE SULFATE PRN MG: 10 SOLUTION ORAL at 09:18

## 2022-01-04 RX ADMIN — MORPHINE SULFATE PRN MG: 10 SOLUTION ORAL at 04:13

## 2022-01-04 RX ADMIN — MORPHINE SULFATE PRN MG: 10 SOLUTION ORAL at 19:08

## 2022-01-04 NOTE — NUR
Problems reprioritized. Patient report given, questions answered & plan of care reviewed 
with Annamaria MOSLEY.

## 2022-01-04 NOTE — NUR
Peralta catheter Fr16 inserted using sterile technique. Lidocaine jelly applied prior to 
insertion of peralta catheter. Patient urinated on the dry flow just before I started 
inserting the peralta. I did not see any urine flow when I inserted the peralta catheter then as 
I finished blood noticed coming out, must have been traumatized. Irrigated peralta catheter 
with normal saline.

## 2022-01-04 NOTE — NUR
Patient has little amount of urine from the peralta catheter tubing, did not drained it at 
this time. Patient has not been eating nor drinking and he was not getting any IV fluid.

## 2022-01-05 NOTE — NUR
Notified pt family Emmiepreet Britt (step daughter) of his expiration. Also let her know that 
she needed to pick a mortuary. She said she would call back when she knew which one she 
wanted to go with. She mentioned something about the  and that she didn't have money 
for a mortuary. I let her know that we do not have a mortuary here at the hospital and she 
needed to choose one. She said she would call back with her choice.

## 2022-01-05 NOTE — NUR
Problems reprioritized. Patient report given, questions answered & plan of care reviewed 
with DIMITRI Vanegas.

## 2022-01-05 NOTE — NUR
Pt family member Emmie was called back to find out what mortuary she had chosen and she 
said she was calling the  when they opened. I explained to her that she had to choose 
a mortuary because we have nowhere to facilitate the decedent on our premises. She told me 
to choose a mortuary then. Damian Martines was called and will be coming as soon as 
possible.